# Patient Record
Sex: MALE | Race: WHITE
[De-identification: names, ages, dates, MRNs, and addresses within clinical notes are randomized per-mention and may not be internally consistent; named-entity substitution may affect disease eponyms.]

---

## 2022-01-02 ENCOUNTER — HOSPITAL ENCOUNTER (EMERGENCY)
Dept: HOSPITAL 63 - ER | Age: 22
LOS: 1 days | Discharge: HOME | End: 2022-01-03
Payer: COMMERCIAL

## 2022-01-02 VITALS — BODY MASS INDEX: 24.39 KG/M2 | HEIGHT: 68 IN | WEIGHT: 160.94 LBS

## 2022-01-02 VITALS — SYSTOLIC BLOOD PRESSURE: 139 MMHG | DIASTOLIC BLOOD PRESSURE: 56 MMHG

## 2022-01-02 DIAGNOSIS — K52.9: Primary | ICD-10-CM

## 2022-01-02 DIAGNOSIS — K59.00: ICD-10-CM

## 2022-01-02 LAB
ALBUMIN SERPL-MCNC: 4.3 G/DL (ref 3.4–5)
ALBUMIN/GLOB SERPL: 1.5 {RATIO} (ref 1–1.7)
ALP SERPL-CCNC: 63 U/L (ref 46–116)
ALT SERPL-CCNC: 22 U/L (ref 16–63)
ANION GAP SERPL CALC-SCNC: 8 MMOL/L (ref 6–14)
APTT PPP: YELLOW S
AST SERPL-CCNC: 19 U/L (ref 15–37)
BACTERIA #/AREA URNS HPF: 0 /HPF
BASOPHILS # BLD AUTO: 0 X10^3/UL (ref 0–0.2)
BASOPHILS NFR BLD: 1 % (ref 0–3)
BILIRUB SERPL-MCNC: 0.3 MG/DL (ref 0.2–1)
BILIRUB UR QL STRIP: (no result)
BUN/CREAT SERPL: 15 (ref 6–20)
CA-I SERPL ISE-MCNC: 15 MG/DL (ref 8–26)
CALCIUM SERPL-MCNC: 8.9 MG/DL (ref 8.5–10.1)
CHLORIDE SERPL-SCNC: 103 MMOL/L (ref 98–107)
CO2 SERPL-SCNC: 31 MMOL/L (ref 21–32)
CREAT SERPL-MCNC: 1 MG/DL (ref 0.7–1.3)
EOSINOPHIL NFR BLD: 0.1 X10^3/UL (ref 0–0.7)
EOSINOPHIL NFR BLD: 2 % (ref 0–3)
ERYTHROCYTE [DISTWIDTH] IN BLOOD BY AUTOMATED COUNT: 13.2 % (ref 11.5–14.5)
FIBRINOGEN PPP-MCNC: CLEAR MG/DL
GFR SERPLBLD BASED ON 1.73 SQ M-ARVRAT: 96.3 ML/MIN
GLOBULIN SER-MCNC: 2.8 G/DL (ref 2.2–3.8)
GLUCOSE SERPL-MCNC: 92 MG/DL (ref 70–99)
GLUCOSE UR STRIP-MCNC: (no result) MG/DL
HCT VFR BLD CALC: 45.6 % (ref 39–53)
HGB BLD-MCNC: 15.7 G/DL (ref 13–17.5)
LYMPHOCYTES # BLD: 2.7 X10^3/UL (ref 1–4.8)
LYMPHOCYTES NFR BLD AUTO: 33 % (ref 24–48)
MCH RBC QN AUTO: 31 PG (ref 25–35)
MCHC RBC AUTO-ENTMCNC: 35 G/DL (ref 31–37)
MCV RBC AUTO: 89 FL (ref 79–100)
MONO #: 0.9 X10^3/UL (ref 0–1.1)
MONOCYTES NFR BLD: 11 % (ref 0–9)
NEUT #: 4.4 X10^3UL (ref 1.8–7.7)
NEUTROPHILS NFR BLD AUTO: 54 % (ref 31–73)
NITRITE UR QL STRIP: (no result)
PLATELET # BLD AUTO: 230 X10^3/UL (ref 140–400)
POTASSIUM SERPL-SCNC: 3.9 MMOL/L (ref 3.5–5.1)
PROT SERPL-MCNC: 7.1 G/DL (ref 6.4–8.2)
RBC # BLD AUTO: 5.11 X10^6/UL (ref 4.3–5.7)
RBC #/AREA URNS HPF: 0 /HPF (ref 0–2)
SODIUM SERPL-SCNC: 142 MMOL/L (ref 136–145)
SP GR UR STRIP: 1.02
SQUAMOUS #/AREA URNS LPF: (no result) /LPF
UROBILINOGEN UR-MCNC: 0.2 MG/DL
WBC # BLD AUTO: 8.1 X10^3/UL (ref 4–11)
WBC #/AREA URNS HPF: 0 /HPF (ref 0–4)

## 2022-01-02 PROCEDURE — 96360 HYDRATION IV INFUSION INIT: CPT

## 2022-01-02 PROCEDURE — 99285 EMERGENCY DEPT VISIT HI MDM: CPT

## 2022-01-02 PROCEDURE — 36415 COLL VENOUS BLD VENIPUNCTURE: CPT

## 2022-01-02 PROCEDURE — 81001 URINALYSIS AUTO W/SCOPE: CPT

## 2022-01-02 PROCEDURE — 74177 CT ABD & PELVIS W/CONTRAST: CPT

## 2022-01-02 PROCEDURE — 80053 COMPREHEN METABOLIC PANEL: CPT

## 2022-01-02 PROCEDURE — 85025 COMPLETE CBC W/AUTO DIFF WBC: CPT

## 2022-01-02 NOTE — PHYS DOC
General Adult


EDM:


Chief Complaint:  ABDOMINAL PAIN





HPI:


HPI:


21-year-old male presents with left-sided abdominal pain.  He has been having 

intermittent pain for a few months.  Is been constant for the last 2 days. There

is a pressure pulling sensation.  It is worse when he is up and walking around. 

It improves when he is laying down.  He has had an ultrasound at another 

hospital couple days ago that was negative.  He denies nausea or vomiting.  No 

reported fever or chills.  He denies any trauma or falls.





Review of Systems:


Review of Systems:


Constitutional:  Denies fever or chills 


Eyes:  Denies change in visual acuity 


HENT:  Denies nasal congestion or sore throat 


Respiratory:  Denies cough or shortness of breath 


Cardiovascular:  Denies chest pain or edema 


GI: Left-sided abdominal pain. Denies nausea, vomiting, bloody stools or 

diarrhea 


: Denies dysuria 


Musculoskeletal:  Denies back pain or joint pain 


Integument:  Denies rash 


Neurologic:  Denies headache, focal weakness or sensory changes 


Endocrine:  Denies polyuria or polydipsia 


Lymphatic:  Denies swollen glands 


Psychiatric:  Denies depression or anxiety





Current Medications:


Current Meds:





Current Medications








 Medications


  (Trade)  Dose


 Ordered  Sig/Frankie  Start Time


 Stop Time Status Last Admin


Dose Admin


 


 Iohexol


  (Omnipaque 300


 Mg/ml)  75 ml  1X  ONCE  1/2/22 23:00


 1/2/22 23:01 UNV  





 


 Ondansetron HCl


  (Zofran)  4 mg  1X  ONCE  1/2/22 23:30


 1/2/22 23:31   





 


 Sodium Chloride  1,000 ml @ 


 1,000 mls/hr  1X  ONCE  1/2/22 23:30


 1/3/22 00:29   














Allergies:


Allergies:





Allergies








Coded Allergies Type Severity Reaction Last Updated Verified


 


  No Known Drug Allergies    1/2/22 No











Physical Exam:


PE:





Constitutional: Well developed, well nourished, no acute distress, non-toxic 

appearance. []


HENT: Normocephalic, atraumatic, bilateral external ears normal, oropharynx 

moist, no oral exudates, nose normal. []


Eyes: PERRLA, EOMI, conjunctiva normal, no discharge. [] 


Neck: Normal range of motion, no tenderness, supple, no stridor. [] 


Cardiovascular:Heart rate regular rhythm, no murmur []


Lungs & Thorax:  Bilateral breath sounds clear to auscultation []


Abdomen: Bowel sounds normal, soft, left-sided tenderness, no masses, no 

pulsatile masses. [] 


Skin: Warm, dry, no erythema, no rash. [] 


Back: No tenderness, no CVA tenderness. [] 


Extremities: No tenderness, no cyanosis, no clubbing, ROM intact, no edema. [] 


Neurologic: Alert and oriented X 3, normal motor function, normal sensory 

function, no focal deficits noted. []


Psychologic: Affect normal, judgement normal, mood normal. []





EKG:


EKG:


[]





Radiology/Procedures:


Radiology/Procedures:


[]





Heart Score:


C/O Chest Pain:  N/A


Risk Factors:


Risk Factors:  DM, Current or recent (<one month) smoker, HTN, HLP, family 

history of CAD, obesity.


Risk Scores:


Score 0 - 3:  2.5% MACE over next 6 weeks - Discharge Home


Score 4 - 6:  20.3% MACE over next 6 weeks - Admit for Clinical Observation


Score 7 - 10:  72.7% MACE over next 6 weeks - Early Invasive Strategies





Course & Med Decision Making:


Course & Med Decision Making


Pertinent Labs and Imaging studies reviewed. (See chart for details)


The patient's labs are unremarkable.  His urinalysis is negative for infection. 

 The CT of the abdomen and pelvis shows some constipation but also thickening of

 the descending and sigmoid colon possibly suggesting colitis.  I will treat him

 with Augmentin for 7 days and recommend that he follow-up with 

gastroenterology.  He is stable for discharge at this time.


[]





Dragon Disclaimer:


Dragon Disclaimer:


This electronic medical record was generated, in whole or in part, using a voice

 recognition dictation system.





Departure


Departure:


Impression:  


   Primary Impression:  


   Constipation


   Additional Impression:  


   Colitis


Disposition:  01 HOME / SELF CARE / HOMELESS


Condition:  STABLE


Referrals:  


PCP,NO (PCP)


Patient Instructions:  Colitis


Scripts


Amoxicillin/Potassium Clav (AUGMENTIN 875-125 TABLET) 1 Each Tablet


1 TAB PO BID for colitis for 7 Days, #14 TAB 0 Refills


   Prov: MIAN LYNNE DO         1/3/22











MIAN LYNNE DO                  Jan 2, 2022 22:57

## 2022-01-03 NOTE — RAD
EXAM: CT Abdomen and Pelvis with IV contrast



CLINICAL HISTORY: Reason: abdominal pain, OMNI 300, 75ml / Spl. Instructions:  / History: .



COMPARISON: none



TECHNIQUE: Helical CT of the abdomen and pelvis was performed following the administration of intrave
nous contrast. Axial, coronal and sagittal reformatted images were generated.



PQRS compliance statement - One or more of the following individualized dose reduction techniques wer
e utilized for this study:

1.  Automated exposure control

2.  Adjustment of the mA and/or kV according to patient size

3.  Use of iterative reconstruction technique



FINDINGS:

Lower Chest: 

Lung bases are clear.



Abdomen and Pelvis: 

Liver is normal. Gallbladder is decompressed. No biliary ductal dilation. The spleen, adrenals, and p
ancreas are unremarkable. Kidneys are normal. No nephrolithiasis or hydronephrosis.



Stomach is normal. No evidence of obstruction there is nonpathologically dilated fluid within the dis
harsh small bowel with air and stool in the terminal ileum suggestive of diarrheal disease. The appendi
x is not definitely visualized. A moderate amount of air and stool seen throughout the colon. There i
s mild circumferential wall thickening and mucosal enhancement involving the distal descending colon 
and also sigmoid colon and rectum. No free intra-abdominal air or free fluid. No pathologically enlar
ged abdominal or pelvic lymph nodes. Vasculature is normal in course and caliber.



Urinary bladder is normal. Prostate gland is unremarkable. Anterior abdominal wall is normal.



Bones: 

No acute or suspicious osseous abnormalities.



IMPRESSION: 



1. Nonpathologically distended segments of distal small bowel with stool seen in the terminal ileum s
uggestive of diarrheal disease and/or mild enteritis.

2. Mild mucosal enhancement with circumferential wall thickening of the descending colon to the level
 of the rectum which may be due to underdistention or mild colitis. Moderate colonic stool burden sug
gestive of mild constipation.



Electronically signed by: Daniel Chou DO (1/3/2022 12:04 AM) [facility]